# Patient Record
Sex: MALE | Race: BLACK OR AFRICAN AMERICAN | NOT HISPANIC OR LATINO | Employment: FULL TIME | URBAN - METROPOLITAN AREA
[De-identification: names, ages, dates, MRNs, and addresses within clinical notes are randomized per-mention and may not be internally consistent; named-entity substitution may affect disease eponyms.]

---

## 2022-09-06 ENCOUNTER — TELEPHONE (OUTPATIENT)
Dept: SURGERY | Facility: CLINIC | Age: 37
End: 2022-09-06

## 2022-09-06 NOTE — TELEPHONE ENCOUNTER
New patient coming in for an appointment  Patient has [employer] insurance which states is a Multi Plan and he also called his insurance to confirm we participate

## 2022-09-07 ENCOUNTER — TELEPHONE (OUTPATIENT)
Dept: SURGERY | Facility: CLINIC | Age: 37
End: 2022-09-07

## 2022-09-07 ENCOUNTER — CONSULT (OUTPATIENT)
Dept: SURGERY | Facility: CLINIC | Age: 37
End: 2022-09-07
Payer: COMMERCIAL

## 2022-09-07 VITALS
DIASTOLIC BLOOD PRESSURE: 77 MMHG | OXYGEN SATURATION: 97 % | WEIGHT: 148.9 LBS | RESPIRATION RATE: 12 BRPM | SYSTOLIC BLOOD PRESSURE: 133 MMHG | BODY MASS INDEX: 23.93 KG/M2 | HEART RATE: 94 BPM | TEMPERATURE: 98.3 F | HEIGHT: 66 IN

## 2022-09-07 DIAGNOSIS — K61.0 PERIANAL ABSCESS: Primary | ICD-10-CM

## 2022-09-07 PROCEDURE — 10060 I&D ABSCESS SIMPLE/SINGLE: CPT | Performed by: SURGERY

## 2022-09-07 NOTE — PROGRESS NOTES
Office Visit - General Surgery  Rojas Manzo MRN: [id number]  Encounter: [id number]    Assessment and Plan  Problem List Items Addressed This Visit        Other    Perianal abscess - Primary     This was a drained in the office  He is asked to remove the packing tomorrow and shower daily to keep the area clean  Keep it covered as long as it is draining  I told may take a week or so to stop draining  Back to work next week as he is a  and it is fairly uncomfortable for him at this time  We will see him back here if needed  Chief Complaint:  Rojas Manzo is a 39 y o  male who presents for Pilonidal Cyst (Consult pilonidal cyst )    Subjective  39year old male who has been having pain since last week the perianal region  Over the weekend his wife says he had fevers and chills as well  History reviewed  No pertinent past medical history  History reviewed  No pertinent surgical history  History reviewed  No pertinent family history  Social History     Tobacco Use    Smoking status: Never Smoker    Smokeless tobacco: Never Used        Medications  No current outpatient medications on file prior to visit  No current facility-administered medications on file prior to visit  Allergies  No Known Allergies    Review of Systems    Objective  Vitals:    09/07/22 1120   BP: 133/77   Pulse: 94   Resp: 12   Temp: 98 3 °F (36 8 °C)   SpO2: 97%       Physical Exam   Perianal on the right side is exquisitely tender fluctuant mass    Procedures  After permission, the areas prepped and draped in usual fashion  Time-out taken  Local anesthesia of 2% lidocaine with epi was infiltrated  An 11 blade knife was used incision made a lot of purulent material was extruded  This area was irrigated out with more local anesthetic  Hemostat was introduced to try to break up any loculations  A small piece of quarter-inch packing was placed  Dressings applied    Tolerated procedure well

## 2022-09-07 NOTE — ASSESSMENT & PLAN NOTE
This was a drained in the office  He is asked to remove the packing tomorrow and shower daily to keep the area clean  Keep it covered as long as it is draining  I told may take a week or so to stop draining  Back to work next week as he is a  and it is fairly uncomfortable for him at this time  We will see him back here if needed

## 2022-09-07 NOTE — TELEPHONE ENCOUNTER
CALLED [employer]  TO VERIFY IF PATIENT INS AND OUR DOCTOR PAR W/ HIS INSURANCE  PER [employer] REP CALL REF# [id number] THAT HE IS ABLE TO GO ANYWHERE TO SEE A DOCTOR WITH THIS INSURANCE

## 2025-04-27 ENCOUNTER — HOSPITAL ENCOUNTER (EMERGENCY)
Facility: HOSPITAL | Age: 40
Discharge: HOME | End: 2025-04-27
Attending: EMERGENCY MEDICINE
Payer: OTHER MISCELLANEOUS

## 2025-04-27 ENCOUNTER — APPOINTMENT (EMERGENCY)
Dept: RADIOLOGY | Facility: HOSPITAL | Age: 40
End: 2025-04-27
Payer: OTHER MISCELLANEOUS

## 2025-04-27 VITALS
TEMPERATURE: 99 F | HEART RATE: 80 BPM | SYSTOLIC BLOOD PRESSURE: 139 MMHG | OXYGEN SATURATION: 100 % | DIASTOLIC BLOOD PRESSURE: 98 MMHG | RESPIRATION RATE: 20 BRPM

## 2025-04-27 DIAGNOSIS — M25.561 RIGHT KNEE PAIN: Primary | ICD-10-CM

## 2025-04-27 PROCEDURE — 99284 EMERGENCY DEPT VISIT MOD MDM: CPT | Performed by: EMERGENCY MEDICINE

## 2025-04-27 PROCEDURE — 73564 X-RAY EXAM KNEE 4 OR MORE: CPT

## 2025-04-27 PROCEDURE — 96372 THER/PROPH/DIAG INJ SC/IM: CPT

## 2025-04-27 PROCEDURE — 99283 EMERGENCY DEPT VISIT LOW MDM: CPT

## 2025-04-27 RX ORDER — ACETAMINOPHEN 325 MG/1
975 TABLET ORAL ONCE
Status: COMPLETED | OUTPATIENT
Start: 2025-04-27 | End: 2025-04-27

## 2025-04-27 RX ORDER — KETOROLAC TROMETHAMINE 30 MG/ML
15 INJECTION, SOLUTION INTRAMUSCULAR; INTRAVENOUS ONCE
Status: COMPLETED | OUTPATIENT
Start: 2025-04-27 | End: 2025-04-27

## 2025-04-27 RX ADMIN — KETOROLAC TROMETHAMINE 15 MG: 30 INJECTION, SOLUTION INTRAMUSCULAR; INTRAVENOUS at 01:14

## 2025-04-27 RX ADMIN — ACETAMINOPHEN 975 MG: 325 TABLET, FILM COATED ORAL at 01:13

## 2025-04-27 NOTE — ED PROVIDER NOTES
Time reflects when diagnosis was documented in both MDM as applicable and the Disposition within this note       Time User Action Codes Description Comment    4/27/2025  4:12 AM Manny Cortezmond Add [M25.561] Right knee pain           ED Disposition       ED Disposition   Discharge    Condition   Stable    Date/Time   Sun Apr 27, 2025  1:47 AM    Comment   Abiel Xiong discharge to home/self care.                   Assessment & Plan       Medical Decision Making  Patient 39-year-old male presenting here today due to right-sided knee pain.    Differentials that I have for this patient include patellar fracture, distal femur fracture and medial epicondyle tibia fracture.     For this patient I ordered Tylenol, Toradol, and right knee plain films.    Amount and/or Complexity of Data Reviewed  Radiology: ordered. Decision-making details documented in ED Course.    Risk  OTC drugs.  Prescription drug management.        ED Course as of 04/27/25 0413   Sun Apr 27, 2025   0147 XR knee 4+ vw right injury  No fracture seen on x-ray.       Medications   acetaminophen (TYLENOL) tablet 975 mg (975 mg Oral Given 4/27/25 0113)   ketorolac (TORADOL) injection 15 mg (15 mg Intramuscular Given 4/27/25 0114)       ED Risk Strat Scores                    No data recorded                            History of Present Illness       Chief Complaint   Patient presents with    Knee Pain     Pt c/o of R knee pain that occurred after he hit it against metal. Pt states since Thursday the pain has been worsening.        History reviewed. No pertinent past medical history.   History reviewed. No pertinent surgical history.   History reviewed. No pertinent family history.   Social History     Tobacco Use    Smoking status: Never    Smokeless tobacco: Never      E-Cigarette/Vaping      E-Cigarette/Vaping Substances      I have reviewed and agree with the history as documented.     Patient 39-year-old male with no pertinent past medical history who  presents here today due to right knee pain.  Patient says that he was working on Thursday and a piece of metal hit his right knee.  Patient said that it hurt for a few minutes, and then there was no pain after that.  Patient said that he did not have any swelling or erythema.  Patient said that he did not have any fevers.  Patient said that he went to sleep, and then later that day around 5:00 he woke up with extreme pain in his right knee.  Patient is the event ever since then he has been having right knee pain and is unable to follow-up walk without pain.  Patient said that he tried Tylenol Extra Strength with no relief.      Knee Pain  Associated symptoms: no back pain and no fever        Review of Systems   Constitutional:  Negative for chills and fever.   HENT:  Negative for ear pain and sore throat.    Eyes:  Negative for pain and visual disturbance.   Respiratory:  Negative for cough and shortness of breath.    Cardiovascular:  Negative for chest pain and palpitations.   Gastrointestinal:  Negative for abdominal pain and vomiting.   Genitourinary:  Negative for dysuria and hematuria.   Musculoskeletal:  Positive for arthralgias and gait problem. Negative for back pain.   Skin:  Negative for color change and rash.   Neurological:  Negative for seizures and syncope.   All other systems reviewed and are negative.          Objective       ED Triage Vitals [04/27/25 0020]   Temperature Pulse Blood Pressure Respirations SpO2 Patient Position - Orthostatic VS   99 °F (37.2 °C) 80 139/98 20 100 % Sitting      Temp Source Heart Rate Source BP Location FiO2 (%) Pain Score    Tympanic -- Right arm -- --      Vitals      Date and Time Temp Pulse SpO2 Resp BP Pain Score FACES Pain Rating User   04/27/25 0020 99 °F (37.2 °C) 80 100 % 20 139/98 -- -- RG            Physical Exam  Vitals and nursing note reviewed.   Constitutional:       General: He is not in acute distress.     Appearance: He is well-developed.   HENT:       Head: Normocephalic and atraumatic.   Eyes:      Conjunctiva/sclera: Conjunctivae normal.   Cardiovascular:      Rate and Rhythm: Normal rate and regular rhythm.      Heart sounds: No murmur heard.  Pulmonary:      Effort: Pulmonary effort is normal. No respiratory distress.      Breath sounds: Normal breath sounds.   Abdominal:      Palpations: Abdomen is soft.      Tenderness: There is no abdominal tenderness.   Musculoskeletal:         General: Tenderness (R knee tenderness along the medial aspect; no erythema or swelling noted. No effusion.) present. No swelling, deformity or signs of injury.      Cervical back: Neck supple.      Right lower leg: No edema.      Left lower leg: No edema.   Skin:     General: Skin is warm and dry.      Capillary Refill: Capillary refill takes less than 2 seconds.      Findings: No bruising or lesion.   Neurological:      Mental Status: He is alert.   Psychiatric:         Mood and Affect: Mood normal.         Results Reviewed       None            XR knee 4+ vw right injury    (Results Pending)       Procedures    ED Medication and Procedure Management   None     There are no discharge medications for this patient.    No discharge procedures on file.  ED SEPSIS DOCUMENTATION   Time reflects when diagnosis was documented in both MDM as applicable and the Disposition within this note       Time User Action Codes Description Comment    4/27/2025  4:12 AM Catarino Cortez Add [M25.561] Right knee pain                  Catarino Cortez MD  04/27/25 6979

## 2025-04-27 NOTE — Clinical Note
Abiel Xiong was seen and treated in our emergency department on 4/27/2025.                Diagnosis:     Abiel  may return to work on return date, is off the rest of the shift today.    He may return on this date: 04/30/2025         If you have any questions or concerns, please don't hesitate to call.      Catarino Cortez MD    ______________________________           _______________          _______________  Hospital Representative                              Date                                Time Statement Selected

## 2025-04-27 NOTE — ED ATTENDING ATTESTATION
4/27/2025  I, Sarita Chacon MD, saw and evaluated the patient. I have discussed the patient with the resident/non-physician practitioner and agree with the resident's/non-physician practitioner's findings, Plan of Care, and MDM as documented in the resident's/non-physician practitioner's note, except where noted. All available labs and Radiology studies were reviewed.  I was present for key portions of any procedure(s) performed by the resident/non-physician practitioner and I was immediately available to provide assistance.       At this point I agree with the current assessment done in the Emergency Department.  I have conducted an independent evaluation of this patient a history and physical is as follows:  Patient complains of right knee pain.  The patient states that he was at work and he struck his right kneecap on Thursday.  He has since been able to walk, but the pain is getting worse and now he is having pain with weightbearing.  His pain is mostly at his lower thigh and the medial aspect.  He states trauma was directly at the patella.  No further falls.  No numbness, tingling, or weakness.  No prior injury.  Patient is otherwise healthy.  On exam the patient awake and alert with normal vital signs.  He has normal perfusion.  On the exam, he is significant tenderness over the right distal medial aspect of his femur.  There is no effusion.  The tail is not ballotable.  The patella itself is not tender.  He has no bruising or skin changes.  The foot and leg are neurovascularly intact.  He has normal range of motion.  The knee is stable.  Impression: Knee pain, likely contusion, doubt fracture.  Will plan to do an x-ray, treat symptomatically  ED Course         Critical Care Time  Procedures

## 2025-04-27 NOTE — Clinical Note
Abiel Xiong was seen and treated in our emergency department on 4/27/2025.                Diagnosis:     Abiel  may return to work on return date, is off the rest of the shift today.    He may return on this date: 04/30/2025         If you have any questions or concerns, please don't hesitate to call.      Catarino Cortez MD    ______________________________           _______________          _______________  Hospital Representative                              Date                                Time